# Patient Record
Sex: MALE | Race: WHITE | Employment: OTHER | URBAN - METROPOLITAN AREA
[De-identification: names, ages, dates, MRNs, and addresses within clinical notes are randomized per-mention and may not be internally consistent; named-entity substitution may affect disease eponyms.]

---

## 2019-10-21 ENCOUNTER — HOSPITAL ENCOUNTER (EMERGENCY)
Age: 39
Discharge: HOME | End: 2019-10-21
Attending: EMERGENCY MEDICINE
Payer: OTHER GOVERNMENT

## 2019-10-21 VITALS
HEART RATE: 103 BPM | SYSTOLIC BLOOD PRESSURE: 176 MMHG | TEMPERATURE: 98.7 F | OXYGEN SATURATION: 99 % | RESPIRATION RATE: 18 BRPM | DIASTOLIC BLOOD PRESSURE: 115 MMHG

## 2019-10-21 DIAGNOSIS — H10.31 ACUTE CONJUNCTIVITIS OF RIGHT EYE, UNSPECIFIED ACUTE CONJUNCTIVITIS TYPE: Primary | ICD-10-CM

## 2019-10-21 PROCEDURE — 99283 EMERGENCY DEPT VISIT LOW MDM: CPT

## 2019-10-21 RX ORDER — PROPARACAINE HYDROCHLORIDE 5 MG/ML
1 SOLUTION/ DROPS OPHTHALMIC
Status: DISCONTINUED | OUTPATIENT
Start: 2019-10-21 | End: 2019-10-21 | Stop reason: HOSPADM

## 2019-10-21 RX ORDER — CEPHALEXIN 500 MG/1
500 CAPSULE ORAL 3 TIMES DAILY
Qty: 21 CAP | Refills: 0 | Status: SHIPPED | OUTPATIENT
Start: 2019-10-21 | End: 2019-10-28

## 2019-10-21 RX ORDER — POLYMYXIN B SULFATE AND TRIMETHOPRIM 1; 10000 MG/ML; [USP'U]/ML
1 SOLUTION OPHTHALMIC EVERY 4 HOURS
Qty: 10 ML | Refills: 0 | Status: SHIPPED | OUTPATIENT
Start: 2019-10-21

## 2019-10-21 RX ORDER — KETOROLAC TROMETHAMINE 4 MG/ML
1 SOLUTION/ DROPS OPHTHALMIC 4 TIMES DAILY
Qty: 1 BOTTLE | Refills: 0 | Status: SHIPPED | OUTPATIENT
Start: 2019-10-21

## 2019-10-21 NOTE — DISCHARGE INSTRUCTIONS

## 2019-10-21 NOTE — LETTER
[facility] EMERGENCY DEPT 
[address] 
[address] [facility] [address] [phone] Work/School Note Date: 10/21/2019 To Whom It May concern: 
 
Nhan Powell was seen and treated today in the emergency room by the following provider(s): 
Attending Provider: Derek Eli MD 
Nurse Practitioner: Iona Cockayne, NP. Please excuse from work 10/21-22. May return 10/23. Sincerely, Rubén Granda NP

## 2019-10-21 NOTE — ED PROVIDER NOTES
9:18 AM Nora Chaidez is a 45 y.o. male with past medical history who presents to ED today with complaints of right eye discomfort, swelling, and redness. Onset 2 days ago with eye itching and redness with crusting which she presumed was pinkeye. He tried an over-the-counter \"pinkeye drops\" and symptoms have worsened since that time. There has been no fever or chills. Denies preceding URI symptoms. No trauma to the eye. No foreign body sensation. Mild visual disturbance. Denies ill contacts. Does not wear glasses or contacts. No other complaints, associated symptoms or modifying factors at this time. PCP: Unknown, Provider             No past medical history on file. Past Surgical History:   Procedure Laterality Date    HX OTHER SURGICAL      cyst         No family history on file.     Social History     Socioeconomic History    Marital status:      Spouse name: Not on file    Number of children: Not on file    Years of education: Not on file    Highest education level: Not on file   Occupational History    Not on file   Social Needs    Financial resource strain: Not on file    Food insecurity:     Worry: Not on file     Inability: Not on file    Transportation needs:     Medical: Not on file     Non-medical: Not on file   Tobacco Use    Smoking status: Current Every Day Smoker     Packs/day: 1.00   Substance and Sexual Activity    Alcohol use: Yes     Comment: sometimes    Drug use: No    Sexual activity: Not on file   Lifestyle    Physical activity:     Days per week: Not on file     Minutes per session: Not on file    Stress: Not on file   Relationships    Social connections:     Talks on phone: Not on file     Gets together: Not on file     Attends Yarsani service: Not on file     Active member of club or organization: Not on file     Attends meetings of clubs or organizations: Not on file     Relationship status: Not on file    Intimate partner violence:     Fear of current or ex partner: Not on file     Emotionally abused: Not on file     Physically abused: Not on file     Forced sexual activity: Not on file   Other Topics Concern    Not on file   Social History Narrative    Not on file         ALLERGIES: Patient has no known allergies. Review of Systems    Vitals:    10/21/19 0910   BP: (!) 176/115   Pulse: (!) 103   Resp: 18   Temp: 98.7 °F (37.1 °C)   SpO2: 99%            Physical Exam   Constitutional: He is oriented to person, place, and time. He appears well-developed and well-nourished. No distress. HENT:   Head: Normocephalic and atraumatic. Mouth/Throat: Oropharynx is clear and moist.   Eyes: Pupils are equal, round, and reactive to light. EOM are normal. Right eye exhibits chemosis and exudate. Right eye exhibits no hordeolum. No foreign body present in the right eye. Left eye exhibits no chemosis and no discharge. Right conjunctiva is injected. Right conjunctiva has no hemorrhage. Left conjunctiva is not injected. Left conjunctiva has no hemorrhage. Scleral icterus is present. Right eye exhibits normal extraocular motion and no nystagmus. Left eye exhibits normal extraocular motion and no nystagmus. See woods lamp exam under procedures   Neck: Normal range of motion. Neck supple. Cardiovascular: Normal rate, regular rhythm, normal heart sounds and intact distal pulses. Pulmonary/Chest: Effort normal and breath sounds normal. No respiratory distress. Abdominal: Soft. Bowel sounds are normal. He exhibits no distension. There is no tenderness. Musculoskeletal: Normal range of motion. He exhibits no edema. Neurological: He is alert and oriented to person, place, and time. Skin: Skin is warm and dry. No rash noted. He is not diaphoretic. Psychiatric: He has a normal mood and affect. His behavior is normal. Judgment and thought content normal.   Nursing note and vitals reviewed.        MDM  Number of Diagnoses or Management Options  Acute conjunctivitis of right eye, unspecified acute conjunctivitis type:   Diagnosis management comments: Patient with HPI and exam findings most c/w conjunctivitis. No trauma. No evidence of FB, corneal abrasion, globe rupture, hyphema or other serious ocular conditions. Mild periorbital ttp and lid swelling w/o erythema or warmth. Will cover empirically with oral abx. Contact lens status has been addressed. Appropriate for outpatient ABX treatment w/ close PCP f/u and ED return for acute changes           Procedures    Judah Lowers Lamp  Performed by Emergency Provider, AMS, NP  Indication: Assess for corneal abnormality  Location: right eye  Procedure: Proparacaine was used for local analgesia. Fluorescein gtts was used. The lids were everted. Full extra-ocular motions were evaluated and are intact. No foreign body was found. Findings: No fluorescein uptake. No corneal abrasions. No corneal ulcerations. ICD-10-CM ICD-9-CM    1.  Acute conjunctivitis of right eye, unspecified acute conjunctivitis type H10.31 372.00      Signed By: Kalina Cruz NP     October 21, 2019

## 2019-10-21 NOTE — ED TRIAGE NOTES
Pt reports right eye pain and redness  since Saturday. Pt states he put some eye drops for redness Saturday which made the pain worse and caused eye swelling.